# Patient Record
Sex: FEMALE | Race: WHITE | NOT HISPANIC OR LATINO | URBAN - METROPOLITAN AREA
[De-identification: names, ages, dates, MRNs, and addresses within clinical notes are randomized per-mention and may not be internally consistent; named-entity substitution may affect disease eponyms.]

---

## 2017-10-09 ENCOUNTER — EMERGENCY (EMERGENCY)
Facility: HOSPITAL | Age: 67
LOS: 1 days | Discharge: HOME | End: 2017-10-09
Attending: EMERGENCY MEDICINE
Payer: COMMERCIAL

## 2017-10-09 VITALS
OXYGEN SATURATION: 98 % | DIASTOLIC BLOOD PRESSURE: 71 MMHG | HEART RATE: 95 BPM | TEMPERATURE: 98 F | SYSTOLIC BLOOD PRESSURE: 134 MMHG | RESPIRATION RATE: 18 BRPM

## 2017-10-09 VITALS
HEART RATE: 90 BPM | TEMPERATURE: 99 F | HEIGHT: 62 IN | SYSTOLIC BLOOD PRESSURE: 179 MMHG | OXYGEN SATURATION: 98 % | WEIGHT: 115.96 LBS | DIASTOLIC BLOOD PRESSURE: 77 MMHG | RESPIRATION RATE: 18 BRPM

## 2017-10-09 DIAGNOSIS — M79.1 MYALGIA: ICD-10-CM

## 2017-10-09 DIAGNOSIS — I10 ESSENTIAL (PRIMARY) HYPERTENSION: ICD-10-CM

## 2017-10-09 DIAGNOSIS — G43.909 MIGRAINE, UNSPECIFIED, NOT INTRACTABLE, WITHOUT STATUS MIGRAINOSUS: ICD-10-CM

## 2017-10-09 PROCEDURE — 93005 ELECTROCARDIOGRAM TRACING: CPT

## 2017-10-09 PROCEDURE — 99284 EMERGENCY DEPT VISIT MOD MDM: CPT | Mod: 25

## 2017-10-09 PROCEDURE — 99284 EMERGENCY DEPT VISIT MOD MDM: CPT

## 2017-10-09 RX ORDER — DIAZEPAM 5 MG
1 TABLET ORAL
Qty: 4 | Refills: 0 | OUTPATIENT
Start: 2017-10-09 | End: 2017-10-11

## 2017-10-09 RX ORDER — IBUPROFEN 200 MG
600 TABLET ORAL ONCE
Qty: 0 | Refills: 0 | Status: COMPLETED | OUTPATIENT
Start: 2017-10-09 | End: 2017-10-09

## 2017-10-09 RX ORDER — DIAZEPAM 5 MG
5 TABLET ORAL ONCE
Qty: 0 | Refills: 0 | Status: DISCONTINUED | OUTPATIENT
Start: 2017-10-09 | End: 2017-10-09

## 2017-10-09 RX ORDER — IBUPROFEN 200 MG
1 TABLET ORAL
Qty: 15 | Refills: 0 | OUTPATIENT
Start: 2017-10-09 | End: 2017-10-14

## 2017-10-09 RX ADMIN — Medication 600 MILLIGRAM(S): at 17:56

## 2017-10-09 RX ADMIN — Medication 5 MILLIGRAM(S): at 17:55

## 2017-10-09 NOTE — ED PROVIDER NOTE - CRANIAL NERVE AND PUPILLARY EXAM
corneal reflex intact/cranial nerves 2-12 intact/central and peripheral vision intact/extra-ocular movements intact/tongue is midline

## 2017-10-09 NOTE — ED ADULT NURSE NOTE - OBJECTIVE STATEMENT
Pt states that she is having headache, vertigo. hx of migraine and vertigo. states she did not take her BP meds yesterday, after her BP being high she took her meds. denies fever, chills

## 2017-10-09 NOTE — ED ADULT NURSE NOTE - CHIEF COMPLAINT
Chief complaint:   Chief Complaint   Patient presents with   • Sore Throat       Vitals:  Visit Vitals  /70   Pulse 94   Temp 98.8 °F (37.1 °C) (Temporal Artery)   SpO2 99%   Breastfeeding? Yes       HISTORY OF PRESENT ILLNESS     Patient reports sore throat for the past 3 days.  She reports a fever yesterday of 101.        Throat Problem    This is a new problem. The current episode started in the past 7 days. The problem has been unchanged. The maximum temperature recorded prior to her arrival was 101 - 101.9 F. The pain is mild. Associated symptoms include a hoarse voice. Pertinent negatives include no abdominal pain, congestion, coughing, ear pain, shortness of breath or trouble swallowing. She has had no exposure to strep. She has tried nothing for the symptoms.       Other significant problems:  Patient Active Problem List    Diagnosis Date Noted   • Bipolar 1 disorder, mixed (CMS/[facility]) 06/18/2014     Priority: Low   • PTSD (post-traumatic stress disorder) 06/18/2014     Priority: Low   • Allergic rhinitis, cause unspecified 10/18/2004     Priority: Low     improved on Rhinocort.     • Attention deficit disorder with hyperactivity 07/08/2004     Priority: Low   • Scoliosis (and kyphoscoliosis), idiopathic 11/14/2002     Priority: Low       PAST MEDICAL, FAMILY AND SOCIAL HISTORY     Medications:  Current Outpatient Prescriptions   Medication   • lisdexamfetamine (VYVANSE) 30 MG capsule   • Prenatal Vitamins (CALNA) Tab   • triamcinolone (ARISTOCORT) 0.1 % cream     No current facility-administered medications for this visit.        Allergies:  ALLERGIES:   Allergen Reactions   • Roxicet [Oxycodone-Acetaminophen] RASH       Past Medical  History/Surgeries:  Past Medical History:   Diagnosis Date   • Anxiety    • Attention deficit disorder without mention of hyperactivity    • Chronic rhinitis    • Depression    • Drug abuse NEC, unspec        Past Surgical History:   Procedure Laterality Date   • REPAIR  UMBILICAL NAVEED,5+Y/O,REDUC  12/3/96    X 2   • TONSILLECTOMY AND ADENOIDECTOMY         Family History:  Family History   Problem Relation Age of Onset   • Adopted: Yes   • Diabetes Mother        Social History:  Social History   Substance Use Topics   • Smoking status: Former Smoker     Packs/day: 0.00   • Smokeless tobacco: Never Used   • Alcohol use 1.2 oz/week     2 Standard drinks or equivalent per week      Comment: every couple of days       REVIEW OF SYSTEMS     Review of Systems   Constitutional: Negative for chills, fatigue and fever.   HENT: Positive for hoarse voice, postnasal drip, rhinorrhea and sore throat. Negative for congestion, ear pain, hearing loss, sinus pressure, tinnitus and trouble swallowing.    Eyes: Negative for pain and redness.   Respiratory: Negative for cough, chest tightness, shortness of breath and wheezing.    Cardiovascular: Negative for chest pain.   Gastrointestinal: Negative for abdominal pain.   Neurological: Negative for dizziness.       PHYSICAL EXAM     Physical Exam   Constitutional: She appears well-developed and well-nourished. No distress.   HENT:   Head: Normocephalic and atraumatic.   Right Ear: External ear normal.   Left Ear: External ear normal.   Nose: Nose normal.   Mouth/Throat: Oropharynx is clear and moist. No oropharyngeal exudate.   Tonsil's absent   Eyes: Conjunctivae and EOM are normal. Pupils are equal, round, and reactive to light. Right eye exhibits no discharge. Left eye exhibits no discharge.   Neck: No thyromegaly present.   Cardiovascular: Normal rate, regular rhythm and normal heart sounds.  Exam reveals no gallop.    No murmur heard.  Pulmonary/Chest: Effort normal and breath sounds normal. No respiratory distress.   Lymphadenopathy:     She has no cervical adenopathy.   Skin: She is not diaphoretic.   Vitals reviewed.    Rapid strep: negative.   ASSESSMENT/PLAN     1. Sore throat secondary to viral illness.    - conservative management encouraged.   Suggest rest, fluids, salt water gargles and tylenol/motrin as needed for fever and pain.    - Follow up her your primary care provider in 1-2 weeks or sooner if necessary.      - STREP GROUP A SCREEN RAPID WITH REFLEX TO CULTURE     The patient is a 67y Female complaining of

## 2017-10-09 NOTE — ED PROVIDER NOTE - OBJECTIVE STATEMENT
66 y/o F pt w/ PMHx of Migraines, Vertigo and HTN presents to ED c/o headache today. Pt states that she did not take her blood pressure medication yesterday; pt took her blood pressure after her headache onset today and noticed it to be high, pt then took her medication. Pt denies fever, chills, nausea, vomiting, or any other complaints. Pt states that she is a . NKDA.

## 2018-03-09 ENCOUNTER — RESULT REVIEW (OUTPATIENT)
Age: 68
End: 2018-03-09

## 2022-05-26 NOTE — ED PROVIDER NOTE - MUSCULOSKELETAL, MLM
Note Text (......Xxx Chief Complaint.): This diagnosis correlates with the
Detail Level: Zone
Render Risk Assessment In Note?: yes
Spine appears normal, range of motion is not limited, no muscle or joint tenderness

## 2024-10-18 ENCOUNTER — EMERGENCY (EMERGENCY)
Facility: HOSPITAL | Age: 74
LOS: 1 days | Discharge: HOME | End: 2024-10-18
Attending: EMERGENCY MEDICINE
Payer: MEDICARE

## 2024-10-18 VITALS
TEMPERATURE: 98 F | RESPIRATION RATE: 16 BRPM | WEIGHT: 125 LBS | HEART RATE: 82 BPM | HEIGHT: 62 IN | OXYGEN SATURATION: 95 % | DIASTOLIC BLOOD PRESSURE: 82 MMHG | SYSTOLIC BLOOD PRESSURE: 183 MMHG

## 2024-10-18 VITALS
OXYGEN SATURATION: 95 % | RESPIRATION RATE: 18 BRPM | DIASTOLIC BLOOD PRESSURE: 83 MMHG | HEART RATE: 80 BPM | TEMPERATURE: 98 F | SYSTOLIC BLOOD PRESSURE: 175 MMHG

## 2024-10-18 PROBLEM — I10 ESSENTIAL (PRIMARY) HYPERTENSION: Chronic | Status: ACTIVE | Noted: 2017-10-09

## 2024-10-18 PROBLEM — G43.909 MIGRAINE, UNSPECIFIED, NOT INTRACTABLE, WITHOUT STATUS MIGRAINOSUS: Chronic | Status: ACTIVE | Noted: 2017-10-09

## 2024-10-18 PROCEDURE — 73130 X-RAY EXAM OF HAND: CPT | Mod: 26,LT

## 2024-10-18 PROCEDURE — 73080 X-RAY EXAM OF ELBOW: CPT | Mod: 26,RT

## 2024-10-18 PROCEDURE — 72170 X-RAY EXAM OF PELVIS: CPT | Mod: 26

## 2024-10-18 PROCEDURE — 71046 X-RAY EXAM CHEST 2 VIEWS: CPT | Mod: 26

## 2024-10-18 PROCEDURE — 99284 EMERGENCY DEPT VISIT MOD MDM: CPT

## 2024-10-18 RX ORDER — LIDOCAINE 50 MG/G
1 CREAM TOPICAL ONCE
Refills: 0 | Status: COMPLETED | OUTPATIENT
Start: 2024-10-18 | End: 2024-10-18

## 2024-10-18 RX ORDER — CYCLOBENZAPRINE HCL 10 MG
10 TABLET ORAL ONCE
Refills: 0 | Status: COMPLETED | OUTPATIENT
Start: 2024-10-18 | End: 2024-10-18

## 2024-10-18 RX ADMIN — LIDOCAINE 1 PATCH: 50 CREAM TOPICAL at 17:38

## 2024-10-18 NOTE — ED PROVIDER NOTE - PATIENT PORTAL LINK FT
You can access the FollowMyHealth Patient Portal offered by [facility] by registering at the following website: [email or web address]. By joining [facility]’s FollowMyHealth portal, you will also be able to view your health information using other applications (apps) compatible with our system.

## 2024-10-18 NOTE — ED ADULT NURSE NOTE - OBJECTIVE STATEMENT
Patient is s/p slipped and fell backward, c/o upper back pain, denied LOC or hit her head, taking ASA. Patient denies chest pain, denies SOB.

## 2024-10-18 NOTE — ED PROVIDER NOTE - PHYSICAL EXAMINATION
Gen:  Awake, alert, NAD, WDWN, NCAT, non-toxic appearing. No skull/facial tender, no step-offs, no raccoon/zelaya.  Eyes:  PERRL, EOMI, no icterus, normal lids/lashes, normal conjunctivae.  ENT:  External inspection normal, pink/moist membranes.   CV:  S1S2, regular rate and rhythm, no murmur/gallops/rubs, no JVD, 2+ pulses b/l, no edema/cords/homans, good capillary refill, warm/well-perfused.  Resp:  Normal respiratory rate/effort, no respiratory distress, normal voice, speaking full sentences, lungs clear to auscultation b/l, no wheezing/rales/rhonchi, no retractions, no stridor.  Abd:  Soft abdomen, no tender/distended/guarding/rebound, no pulsatile mass, no CVA tender.   Musculoskeletal:  N/V intact, FROM all 4 extremities, normal motor tone, stable gait. Pelvis stable, no TLS spinal tender/deformity/step-offs. Diffuse buttock discomfort to palpation. L hand:  FROM including flex/ext/abd/add/opp including FDP & FDS, no shaun deformity, no snuffbox tender, + tender 2nd metacarpal  Neck:  FROM neck, supple, trachea midline, no meningismus. No C-spine tender/deformity/step-offs. Nexus negative.  Skin:  Color normal for race, warm and dry, no rash. L hand dorsal aspect ecchymosis between digits 1-2.  Neuro:  Oriented x3, CN 2-12 intact, normal motor, normal sensory, normal gait. GCS 15  Psych:  Attention normal. Affect normal. Behavior normal. Judgment normal.

## 2024-10-18 NOTE — ED PROVIDER NOTE - NSFOLLOWUPINSTRUCTIONS_ED_ALL_ED_FT
Please follow up with your PMD or Medicine Clinic in 2-3 days.  Follow up with Orthopedics & Physical Therapy in 1 week.  Return to the ER for worsening or concerning symptoms.  Your results for testing will be available in the Follow My Health application which can be downloaded to your phone or checked online on a computer.  [email or web address].   Apply ice to swollen joints or areas, rest extremity and keep elevated.  Take Acetaminophen (Tylenol) 650mg every 6 hours as needed for pain or fever.  Take Ibuprofen (Advil or Motrin) 400mg every 6 hours as needed for pain or fever with food.  Take Cyclobenzaprine (Flexeril) 10mg every 8-12 hours as needed for pain/muscle spasm. This medication can be sedating. Do not mix sedatives, drink alcohol, or operative car/heavy machinery when using this medication.  - - - - - - - - - - - - -  Contusion  A contusion is a deep bruise. This is a result of an injury that causes bleeding under the skin. Symptoms of bruising include pain, swelling, and discolored skin. The skin may turn blue, purple, or yellow.    Follow these instructions at home:  Managing pain, stiffness, and swelling    Bag of ice on a towel on the skin.  You may use RICE. This stands for:  Resting.  Icing.  Compression, or putting pressure on the injured area.  Elevating, or raising the injured area.  To follow this method, do these actions:  Rest the injured area.  If told, put ice on the injured area. To do this:  Put ice in a plastic bag.  Place a towel between your skin and the bag.  Leave the ice on for 20 minutes, 2–3 times per day.  If your skin turns bright red, take off the ice right away to prevent skin damage. The risk of skin damage is higher if you cannot feel pain, heat, or cold.  If told, apply compression on the injured area using an elastic bandage. Make sure the bandage is not too tight. If the area tingles or has a loss of feeling (numbness), remove it and put it back on as told by your doctor.  If possible, elevate the injured area above the level of your heart while you are sitting or lying down.  General instructions    Take over-the-counter and prescription medicines only as told by your doctor.  Keep all follow-up visits. Your doctor may want to see how your contusion is healing with treatment.  Contact a doctor if:  Your symptoms do not get better after several days of treatment.  Your symptoms get worse.  You have trouble moving the injured area.  Get help right away if:  You have very bad pain.  You have a loss of feeling (numbness) in a hand or foot.  Your hand or foot turns pale or cold.  This information is not intended to replace advice given to you by your health care provider. Make sure you discuss any questions you have with your health care provider.

## 2024-10-18 NOTE — ED PROVIDER NOTE - OBJECTIVE STATEMENT
74 female with hx of HTN.   Patient presenting to the ED reporting mechanical slip/fall on water in her kitchen ~3 hours ago.  Patient reporting pain to upper back & buttocks as well as left hand.  No head/neck trauma.  No LOC.  No blood thinner use.  Patient in usual state of health prior, no other issues or complaints.

## 2024-10-18 NOTE — ED PROVIDER NOTE - NS ED ROS FT
Constitutional: (-) fever (-) chills  HENT: (-) congestion (-) rhinorrhea (-) sore throat  Eyes: (-) pain (-) redness  Respiratory: (-) cough (-) shortness of breath (-) wheezing (-) stridor  Cardiovascular: (-) chest pain (-) palpitations (-) leg swelling  Gastrointestinal: (-) abdominal pain (-) blood in stool (no melena/hematochezia) (-) diarrhea (-) vomiting  Genitourinary: (-) dysuria (-) hematuria  Musculoskeletal: (-) gait problem (-) joint swelling (-) myalgias (+) L hand pain (+) uppre back pain (+) buttock pain  Skin: (-) wound  Neurological: (-) weakness (-) numbness (-) headaches  Psychiatric/Behavioral: (-) confusion

## 2024-10-18 NOTE — ED PROVIDER NOTE - NSFOLLOWUPCLINICS_GEN_ALL_ED_FT
[facility]  Physical Therapy  Multiple Location  [place]   Phone: [phone]  Fax:   Follow Up Time: 4-6 Days    [facility]  Internal Medicine  [address] [place] [address]  Phone: [phone]  Fax: [phone]  Follow Up Time: 1-3 Days    [facility]  Orthopedics  [address] [place] [address]  Phone: [phone]  Fax: [phone]  Follow Up Time: 4-6 Days

## 2024-10-18 NOTE — ED PROVIDER NOTE - CLINICAL SUMMARY MEDICAL DECISION MAKING FREE TEXT BOX
74 female with hx of HTN.   Patient presenting to the ED reporting mechanical slip/fall on water in her kitchen ~3 hours ago.  Patient reporting pain to upper back & buttocks as well as left hand.  No head/neck trauma.  No LOC.  No blood thinner use.  Patient in usual state of health prior, no other issues or complaints.    Vitals with elevated BP.  Nontoxic appearing, n/v intact.  Airway intact, no respiratory distress, no hypoxia.  No abdominal or CVA tenderness.    Non-focal neuro.    Plan to obtain:  -XR's R/O fracture, analgesia as needed, observe/reassess.    ED Course:  Lab values demonstrate no acute/emergent pathology.  My independent interpretation of the EKG: Sinus @ [], normal axis, normal intervals, normal ST/T  My independent interpretation of XR: [No consolidation/effusion/pntx]    [***]    [Patient advised regarding need for close outpatient follow up.  Patient stable for further care in outpatient setting. No indication for inpatient admission at this time. Patient advised regarding symptomatic & supportive care and symptoms to prompt ED return. Strict return precautions provided.]    [Patient requires inpatient admission for further care & stabilization. Care signed out to inpatient team.] 74 female with hx of HTN.   Patient presenting to the ED reporting mechanical slip/fall on water in her kitchen ~3 hours ago.  Patient reporting pain to upper back & buttocks as well as left hand.  No head/neck trauma.  No LOC.  No blood thinner use.  Patient in usual state of health prior, no other issues or complaints.    Vitals with elevated BP.  Nontoxic appearing, n/v intact.  Airway intact, no respiratory distress, no hypoxia.  No abdominal or CVA tenderness.    Non-focal neuro.    Plan to obtain:  -XR's R/O fracture, analgesia as needed, observe/reassess.    ED Course:  XR's ok  Analgesia given.  Patient/Family advised regarding need for close outpatient follow up.  Patient stable for further care in outpatient setting. No indication for inpatient admission at this time. Patient/Family advised regarding symptomatic & supportive care and symptoms to prompt ED return. Strict return precautions provided.

## 2024-10-18 NOTE — ED PROVIDER NOTE - WR ORDER NAME 2
Subjective   Patient ID: Christi Ta is a 53 y.o. female  Follow-up and Pain of the Right Knee (Patient is here today for her MRI results.)             History of Present Illness  Right knee pain is nearly resolved after her last steroid injection, recent MR showed no discrete evidence for meniscal tear, she did fall last week walking in a parking lot feel some weakness in the leg as well.      Review of Systems   Constitutional: Negative for fever.   HENT: Negative for voice change.    Eyes: Negative for visual disturbance.   Respiratory: Negative for shortness of breath.    Cardiovascular: Negative for chest pain.   Gastrointestinal: Negative for abdominal distention and abdominal pain.   Genitourinary: Negative for dysuria.   Musculoskeletal: Positive for arthralgias. Negative for gait problem and joint swelling.   Skin: Negative for rash.   Neurological: Negative for speech difficulty.   Hematological: Does not bruise/bleed easily.   Psychiatric/Behavioral: Negative for confusion.       Past Medical History:   Diagnosis Date   • Diabetes (CMS/HCC)    • Fibromyalgia    • Hypertension    • Obesity         Past Surgical History:   Procedure Laterality Date   •  SECTION         History reviewed. No pertinent family history.    Social History     Socioeconomic History   • Marital status:      Spouse name: Not on file   • Number of children: Not on file   • Years of education: Not on file   • Highest education level: Not on file   Social Needs   • Financial resource strain: Not on file   • Food insecurity - worry: Not on file   • Food insecurity - inability: Not on file   • Transportation needs - medical: Not on file   • Transportation needs - non-medical: Not on file   Occupational History   • Not on file   Tobacco Use   • Smoking status: Former Smoker   • Smokeless tobacco: Never Used   Substance and Sexual Activity   • Alcohol use: Yes     Comment: SOCAIL -OCC    • Drug use: No   • Sexual activity:  Defer   Other Topics Concern   • Not on file   Social History Narrative   • Not on file       I have reviewed all of the above social hx, family hx, surgical hx, medications, allergies & ROS and confirm that it is accurate.    Allergies   Allergen Reactions   • Cetirizine Nausea Only   • Codeine Nausea Only         Current Outpatient Medications:   •  aspirin 81 MG EC tablet, Take 81 mg by mouth Daily., Disp: , Rfl:   •  buPROPion SR (WELLBUTRIN SR) 150 MG 12 hr tablet, Take  by mouth 2 (Two) Times a Day., Disp: , Rfl:   •  celecoxib (CELEBREX) 200 MG capsule, Take  by mouth Daily., Disp: , Rfl:   •  cephalexin (KEFLEX) 500 MG capsule, Take 1 capsule by mouth 3 (Three) Times a Day., Disp: 30 capsule, Rfl: 0  •  furosemide (LASIX) 20 MG tablet, , Disp: , Rfl:   •  hydroxychloroquine (PLAQUENIL) 200 MG tablet, Take 200 mg by mouth Daily., Disp: , Rfl:   •  KLOR-CON 10 MEQ CR tablet, , Disp: , Rfl:   •  lisinopril-hydrochlorothiazide (PRINZIDE,ZESTORETIC) 20-25 MG per tablet, Take  by mouth Daily., Disp: , Rfl:   •  Loratadine (CLARITIN) 10 MG capsule, Take  by mouth., Disp: , Rfl:   •  melatonin 5 MG tablet tablet, Take 5 mg by mouth., Disp: , Rfl:   •  minocycline (DYNACIN) 100 MG tablet, Take  by mouth 2 (Two) Times a Day., Disp: , Rfl:   •  omeprazole (priLOSEC) 40 MG capsule, , Disp: , Rfl:   •  Omeprazole 20 MG tablet delayed-release, Take  by mouth Daily., Disp: , Rfl:   •  ondansetron (ZOFRAN) 8 MG tablet, , Disp: , Rfl:   •  PredniSONE (DELTASONE) 10 MG (21) tablet pack, Daily taper- 6/5/4/3/2/1, Disp: 21 tablet, Rfl: 0  •  pregabalin (LYRICA) 100 MG capsule, Take 150 mg by mouth 2 (Two) Times a Day., Disp: , Rfl:   •  promethazine (PHENERGAN) 25 MG tablet, , Disp: , Rfl:   •  promethazine-codeine (PHENERGAN with CODEINE) 6.25-10 MG/5ML syrup, Take 5 mL by mouth Every 4 (Four) Hours As Needed for Cough., Disp: 90 mL, Rfl: 0  •  rOPINIRole (REQUIP) 1 MG tablet, Take  by mouth., Disp: , Rfl:     Objective   Resp  "18   Ht 152.4 cm (60\")   Wt 115 kg (253 lb)   BMI 49.41 kg/m²    Physical Exam  Constitutional: Patient is oriented to person, place, and time. Patient appears well-developed and well-nourished.   HENT:Head: Normocephalic and atraumatic.   Eyes: EOM are normal. Pupils are equal, round, and reactive to light.   Neck: Normal range of motion. Neck supple.   Cardiovascular: Normal rate.    Pulmonary/Chest: Effort normal and breath sounds normal.   Abdominal: Soft.   Neurological: Patient is alert and oriented to person, place, and time.   Skin: Skin is warm and dry.   Psychiatric: Patient has a normal mood and affect.   Nursing note and vitals reviewed.       [unfilled]   Right knee with no effusion full extension minimal medial joint line pain Obdulio sign negative ligament exam unremarkable neurovascular status intact full range of motion    Assessment/Plan   Review of Radiographic Studies:    No new imaging done today.      Procedures     Christi was seen today for follow-up and pain.    Diagnoses and all orders for this visit:    Chondromalacia of right knee       Physical therapy referral given      Recommendations/Plan:   Referral: PT/OT 2-3 x wk x 4-6 wks    Patient agreeable to call or return sooner for any concerns.             Impression: Resolved right anterior knee pain chondromalacia patella     Plan:  Refer to therapy for home exercise program recheck with me in 2-3 months consider repeat steroid injection of pain recurs and/or diagnostic arthroscopy to evaluate for occult posterior horn meniscal tear if posterior knee pain recurs  " Xray Hand 3 Views, Left

## 2024-10-18 NOTE — ED ADULT NURSE NOTE - NSFALLHARMRISKINTERV_ED_ALL_ED

## 2024-11-20 PROCEDURE — 99284 EMERGENCY DEPT VISIT MOD MDM: CPT | Mod: 25

## 2024-11-20 PROCEDURE — 73130 X-RAY EXAM OF HAND: CPT

## 2024-11-20 PROCEDURE — 71046 X-RAY EXAM CHEST 2 VIEWS: CPT

## 2024-11-20 PROCEDURE — 73080 X-RAY EXAM OF ELBOW: CPT

## 2024-11-20 PROCEDURE — 72170 X-RAY EXAM OF PELVIS: CPT

## 2025-08-20 ENCOUNTER — ASOB RESULT (OUTPATIENT)
Age: 75
End: 2025-08-20

## 2025-08-20 ENCOUNTER — RESULT CHARGE (OUTPATIENT)
Age: 75
End: 2025-08-20

## 2025-08-20 ENCOUNTER — APPOINTMENT (OUTPATIENT)
Facility: CLINIC | Age: 75
End: 2025-08-20

## 2025-08-20 VITALS — DIASTOLIC BLOOD PRESSURE: 89 MMHG | SYSTOLIC BLOOD PRESSURE: 183 MMHG

## 2025-08-20 DIAGNOSIS — N84.0 POLYP OF CORPUS UTERI: ICD-10-CM

## 2025-08-20 DIAGNOSIS — N95.0 POSTMENOPAUSAL BLEEDING: ICD-10-CM

## 2025-08-20 LAB
BILIRUB UR QL STRIP: NORMAL
GLUCOSE UR-MCNC: NORMAL
HCG UR QL: 0.2 EU/DL
HGB UR QL STRIP.AUTO: NORMAL
KETONES UR-MCNC: NORMAL
LEUKOCYTE ESTERASE UR QL STRIP: NORMAL
NITRITE UR QL STRIP: NORMAL
PH UR STRIP: 6
PROT UR STRIP-MCNC: NORMAL
SP GR UR STRIP: 1.02

## 2025-08-20 PROCEDURE — 99214 OFFICE O/P EST MOD 30 MIN: CPT

## 2025-08-20 PROCEDURE — 76376 3D RENDER W/INTRP POSTPROCES: CPT

## 2025-08-20 PROCEDURE — 76830 TRANSVAGINAL US NON-OB: CPT

## 2025-08-20 PROCEDURE — 99459 PELVIC EXAMINATION: CPT | Mod: NC

## 2025-08-22 LAB — BACTERIA UR CULT: NORMAL

## 2025-08-25 ENCOUNTER — APPOINTMENT (OUTPATIENT)
Facility: CLINIC | Age: 75
End: 2025-08-25

## 2025-08-25 ENCOUNTER — ASOB RESULT (OUTPATIENT)
Age: 75
End: 2025-08-25

## 2025-08-25 VITALS — SYSTOLIC BLOOD PRESSURE: 188 MMHG | DIASTOLIC BLOOD PRESSURE: 80 MMHG

## 2025-08-25 PROCEDURE — 58558Z: CUSTOM

## 2025-08-25 PROCEDURE — 76857 US EXAM PELVIC LIMITED: CPT

## 2025-08-26 ENCOUNTER — APPOINTMENT (OUTPATIENT)
Facility: CLINIC | Age: 75
End: 2025-08-26

## 2025-08-28 LAB — CORE LAB BIOPSY: NORMAL

## 2025-09-05 ENCOUNTER — APPOINTMENT (OUTPATIENT)
Facility: CLINIC | Age: 75
End: 2025-09-05

## 2025-09-05 VITALS — SYSTOLIC BLOOD PRESSURE: 182 MMHG | DIASTOLIC BLOOD PRESSURE: 82 MMHG

## 2025-09-05 DIAGNOSIS — N84.0 POLYP OF CORPUS UTERI: ICD-10-CM

## 2025-09-05 DIAGNOSIS — N95.0 POSTMENOPAUSAL BLEEDING: ICD-10-CM

## 2025-09-08 LAB — BACTERIA UR CULT: NORMAL
